# Patient Record
Sex: MALE | Race: WHITE | NOT HISPANIC OR LATINO | Employment: OTHER | URBAN - METROPOLITAN AREA
[De-identification: names, ages, dates, MRNs, and addresses within clinical notes are randomized per-mention and may not be internally consistent; named-entity substitution may affect disease eponyms.]

---

## 2024-07-14 ENCOUNTER — OFFICE VISIT (OUTPATIENT)
Dept: URGENT CARE | Facility: URGENT CARE | Age: 49
End: 2024-07-14
Payer: COMMERCIAL

## 2024-07-14 VITALS
RESPIRATION RATE: 16 BRPM | HEART RATE: 110 BPM | SYSTOLIC BLOOD PRESSURE: 132 MMHG | DIASTOLIC BLOOD PRESSURE: 93 MMHG | WEIGHT: 174 LBS | TEMPERATURE: 98.8 F | OXYGEN SATURATION: 97 %

## 2024-07-14 DIAGNOSIS — K04.7 DENTAL ABSCESS: Primary | ICD-10-CM

## 2024-07-14 PROCEDURE — 99203 OFFICE O/P NEW LOW 30 MIN: CPT | Performed by: NURSE PRACTITIONER

## 2024-07-14 RX ORDER — BUPRENORPHINE 8 MG/1
24 TABLET SUBLINGUAL ONCE
COMMUNITY

## 2024-07-14 RX ORDER — NICOTINE POLACRILEX 4 MG/1
20 GUM, CHEWING ORAL DAILY
COMMUNITY

## 2024-07-14 NOTE — PROGRESS NOTES
SUBJECTIVE:  Diaz Olguin is a 48 year old male here today with tooth pain 4.   Has been going on for 4 day(s).   Relieving Factors:  Nothing   Worse with: Hot and Cold yes  Symptoms have been gradual since that time.  Prior history of related problems:Yes     Past Medical, social, family histories, medications, and allergies reviewed and updated    ROS:  Review of systems negative except as stated above.    OBJECTIVE:   Blood pressure (!) 132/93, pulse 110, temperature 98.8  F (37.1  C), temperature source Tympanic, resp. rate 16, weight 78.9 kg (174 lb), SpO2 97%.  Eye exam is normal - TERELL, EOMI, corneas normal.  EARS: canals clear, TM retracted not injected .  Oropharyngeal exam - Poor hygeine. Some teeth missing.  The neck is supple and free of adenopathy or masses, the thyroid is normal without enlargement or nodules.    ASSESSMENT:  (K04.7) Dental abscess  (primary encounter diagnosis)  Comment:   Plan: amoxicillin-clavulanate (AUGMENTIN) 875-125 MG         tablet      PLAN:  Improve oral hygeine  Follow up with dentist  Tylenol 1-2 tabs po q4h prn / motrin prn    Ondina Rosas, DARRICK CNP